# Patient Record
Sex: FEMALE | Race: WHITE
[De-identification: names, ages, dates, MRNs, and addresses within clinical notes are randomized per-mention and may not be internally consistent; named-entity substitution may affect disease eponyms.]

---

## 2017-10-24 ENCOUNTER — HOSPITAL ENCOUNTER (EMERGENCY)
Dept: HOSPITAL 25 - ED | Age: 62
Discharge: HOME | End: 2017-10-24
Payer: MEDICARE

## 2017-10-24 VITALS — SYSTOLIC BLOOD PRESSURE: 156 MMHG | DIASTOLIC BLOOD PRESSURE: 93 MMHG

## 2017-10-24 DIAGNOSIS — R10.32: ICD-10-CM

## 2017-10-24 DIAGNOSIS — K59.00: ICD-10-CM

## 2017-10-24 DIAGNOSIS — R11.2: ICD-10-CM

## 2017-10-24 DIAGNOSIS — F17.210: ICD-10-CM

## 2017-10-24 DIAGNOSIS — K21.9: Primary | ICD-10-CM

## 2017-10-24 LAB
ALBUMIN SERPL BCG-MCNC: 4.6 G/DL (ref 3.2–5.2)
ALP SERPL-CCNC: 78 U/L (ref 34–104)
ALT SERPL W P-5'-P-CCNC: 49 U/L (ref 7–52)
ANION GAP SERPL CALC-SCNC: 5 MMOL/L (ref 2–11)
AST SERPL-CCNC: 33 U/L (ref 13–39)
BUN SERPL-MCNC: 6 MG/DL (ref 6–24)
BUN/CREAT SERPL: 7.6 (ref 8–20)
CALCIUM SERPL-MCNC: 9.8 MG/DL (ref 8.6–10.3)
CHLORIDE SERPL-SCNC: 101 MMOL/L (ref 101–111)
GLOBULIN SER CALC-MCNC: 3.1 G/DL (ref 2–4)
GLUCOSE SERPL-MCNC: 100 MG/DL (ref 70–100)
HCO3 SERPL-SCNC: 30 MMOL/L (ref 22–32)
HCT VFR BLD AUTO: 47 % (ref 35–47)
HGB BLD-MCNC: 16.2 G/DL (ref 12–16)
LIPASE SERPL-CCNC: 40 U/L (ref 11–82)
MCH RBC QN AUTO: 30 PG (ref 27–31)
MCHC RBC AUTO-ENTMCNC: 35 G/DL (ref 31–36)
MCV RBC AUTO: 86 FL (ref 80–97)
POTASSIUM SERPL-SCNC: 3.8 MMOL/L (ref 3.5–5)
PROT SERPL-MCNC: 7.7 G/DL (ref 6.4–8.9)
RBC # BLD AUTO: 5.42 10^6/UL (ref 4–5.4)
SODIUM SERPL-SCNC: 136 MMOL/L (ref 133–145)
WBC # BLD AUTO: 5.2 10^3/UL (ref 3.5–10.8)

## 2017-10-24 PROCEDURE — 86140 C-REACTIVE PROTEIN: CPT

## 2017-10-24 PROCEDURE — 80053 COMPREHEN METABOLIC PANEL: CPT

## 2017-10-24 PROCEDURE — 74177 CT ABD & PELVIS W/CONTRAST: CPT

## 2017-10-24 PROCEDURE — 36415 COLL VENOUS BLD VENIPUNCTURE: CPT

## 2017-10-24 PROCEDURE — 85610 PROTHROMBIN TIME: CPT

## 2017-10-24 PROCEDURE — 85730 THROMBOPLASTIN TIME PARTIAL: CPT

## 2017-10-24 PROCEDURE — 83690 ASSAY OF LIPASE: CPT

## 2017-10-24 PROCEDURE — 85025 COMPLETE CBC W/AUTO DIFF WBC: CPT

## 2017-10-24 PROCEDURE — 99282 EMERGENCY DEPT VISIT SF MDM: CPT

## 2017-10-24 PROCEDURE — 83605 ASSAY OF LACTIC ACID: CPT

## 2017-10-24 RX ADMIN — SODIUM CHLORIDE ONE MLS/HR: 900 IRRIGANT IRRIGATION at 17:13

## 2017-10-24 NOTE — ED
Karol ARGUETA Alfonso, scribed for Diego Torrez MD on 10/24/17 at 1523 .





Abdominal Pain/Female





- HPI Summary


HPI Summary: 


 This patient is a 62 year old F presenting to [facility] with a chief complaint of 

acute on chronic lower abdominal pain and bloating, worse since a few days ago. 

The patient rates the current pain 0/10 in severity. Symptoms aggravated by 

nothing. Symptoms alleviated by nothing. Symptoms not alleviated by miralax. 

Patient reports constipation, nausea, vomiting, and gastric reflux. Patient 

denies back pain. Her last colonoscopy was approximately two years ago.





- History of Current Complaint


Chief Complaint: EDAbdPain


Stated Complaint: CONSTIPATION/UNABLE TO EAT


Time Seen by Provider: 10/24/17 15:19


Hx Obtained From: Patient


Onset/Duration: Gradual Onset, Worse Since - a few days ago, Other - acute on 

chronic


Timing: Constant


Pain Intensity: 0


Pain Scale Used: 0-10 Numeric


Location: Other - Lower


Character: Other: - bloating


Aggravating Factor(s): Nothing


Alleviating Factor(s): Nothing


Associated Signs and Symptoms: Positive: Other: - constipation, nausea, vomiting

, and gastric reflux. Patient denies back pain.


Allergies/Adverse Reactions: 


 Allergies











Allergy/AdvReac Type Severity Reaction Status Date / Time


 


Pregabalin [From Lyrica] Allergy Intermediate See Comment Verified 08/26/16 13:

42


 


Alprazolam [From Xanax] Allergy Unknown Unknown Verified 08/26/16 13:42





   Reaction  





   Details  


 


Bupropion [From Zyban] Allergy Unknown Unknown Verified 08/26/16 13:42





   Reaction  





   Details  


 


Codeine Allergy  Unknown Verified 08/26/16 13:42





   Reaction  





   Details  


 


Shellfish Allergy Allergy  Anaphylatic Verified 08/26/16 13:42





   Shock  


 


Tramadol AdvReac Intermediate Legs Jump Verified 08/26/16 13:42


 


Simvastatin AdvReac  Rash Verified 08/26/16 13:42














PMH/Surg Hx/FS Hx/Imm Hx


Cardiovascular History: Reports: Hx Hypercholesterolemia, Hx Hypertension, 

Other Cardiovascular Problems/Disorders


   Denies: Hx Pacemaker/ICD


GI History: Reports: Hx Diverticulosis - Colonoscopy 2004, Hx Irritable Bowel, 

Other GI Disorders - Elevated Liver Enzymes


Musculoskeletal History: Reports: Hx Fibromyalgia, Hx Scoliosis, Other 

Musculoskeletal History - Degenerative Disc Disease


   Comment Only: Hx Back Problems - CHRONIC BACK PAIN


Sensory History: 


   Denies: Hx Hearing Aid


Neurological History: Reports: Other Neuro Impairments/Disorders - LUMBAR 

STENOSIS


Psychiatric History: Reports: Hx Bipolar Disorder


   Denies: Hx Panic Disorder





- Cancer History


Cancer Type, Location and Year: Fibrocystic Breast (Left) Papilloma with 

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





- Surgical History


Surgery Procedure, Year, and Place: ; Intraductal Papilloma Biopsy 2003-

Hyperplasia left breast; Tonsillectomy; Hysterectomy-1993 left ovary intact; 

Tubal Ligation; Removal.  Benign Breast Lumps [id number]; Removal Benign Moles; 

Lymph Node Removal Axilla Right Arm; Cholecystectomy 6/29/09; Lump removed 

under right arm -1980; Chemical Burning of Nerves in lower back-2009


Infectious Disease History: No


Infectious Disease History: Reports: History Other Infectious Disease - 

Toxoplasmosis


   Denies: Traveled Outside the [place] in Last 30 Days





- Family History


Known Family History: Positive: Other - Cancer





- Social History


Alcohol Use: None


Substance Use Type: Reports: None


Substance Use Comment - Amount & Last Used: fentanyl patch and hydrocodone


Smoking Status (MU): Current Every Day Smoker


Type: Cigarettes


Amount Used/How Often: 1/2 PPD





Review of Systems


Negative: Fever


Positive: Abdominal Pain, Vomiting, Nausea, Other - constipation, gastric reflux


Positive: Other - Negative back pain


All Other Systems Reviewed And Are Negative: Yes





Physical Exam





- Summary


Physical Exam Summary: 





General: well-appearing, no pain distress


Skin: warm, color reflects adequate perfusion, dry


Head: normal


Eyes: EOMI, FRANCO


ENT: normal


Neck: supple, nontender


Respiratory: CTA, breath sounds present


Cardiovascular: RRR


Abdomen: soft, Mild lower abdominal tenderness and LUQ.


Bowel: Hypoactive bowel sounds


Musculoskeletal: normal, strength/ROM intact


Neurological: normal, sensory/motor intact, A&O x3


Psychological: affect/mood appropriate


Triage Information Reviewed: Yes


Vital Signs On Initial Exam: 


 Initial Vitals











Temp Pulse Resp BP Pulse Ox


 


 97.3 F   103   20   157/109   98 


 


 10/24/17 13:29  10/24/17 13:29  10/24/17 13:29  10/24/17 13:29  10/24/17 13:29











Vital Signs Reviewed: Yes





Diagnostics





- Vital Signs


 Vital Signs











  Temp Pulse Resp BP Pulse Ox


 


 10/24/17 13:29  97.3 F  103  20  157/109  98














- Laboratory


Lab Results: 


 Lab Results











  10/24/17 10/24/17 10/24/17 Range/Units





  16:30 16:30 16:30 


 


WBC   5.2   (3.5-10.8)  10^3/ul


 


RBC   5.42 H   (4.0-5.4)  10^6/ul


 


Hgb   16.2 H   (12.0-16.0)  g/dl


 


Hct   47   (35-47)  %


 


MCV   86   (80-97)  fL


 


MCH   30   (27-31)  pg


 


MCHC   35   (31-36)  g/dl


 


RDW   13   (10.5-15)  %


 


Plt Count   316   (150-450)  10^3/ul


 


MPV   7 L   (7.4-10.4)  um3


 


Neut % (Auto)   54.3   (38-83)  %


 


Lymph % (Auto)   32.8   (25-47)  %


 


Mono % (Auto)   8.8   (1-9)  %


 


Eos % (Auto)   3.2   (0-6)  %


 


Baso % (Auto)   0.9   (0-2)  %


 


Absolute Neuts (auto)   2.8   (1.5-7.7)  10^3/ul


 


Absolute Lymphs (auto)   1.7   (1.0-4.8)  10^3/ul


 


Absolute Monos (auto)   0.5   (0-0.8)  10^3/ul


 


Absolute Eos (auto)   0.2   (0-0.6)  10^3/ul


 


Absolute Basos (auto)   0   (0-0.2)  10^3/ul


 


Absolute Nucleated RBC   0.03   10^3/ul


 


Nucleated RBC %   0.5   


 


INR (Anticoag Therapy)    0.83 L  (0.89-1.11)  


 


APTT    35.4  (26.0-36.3)  seconds


 


Sodium  136    (133-145)  mmol/L


 


Potassium  3.8    (3.5-5.0)  mmol/L


 


Chloride  101    (101-111)  mmol/L


 


Carbon Dioxide  30    (22-32)  mmol/L


 


Anion Gap  5    (2-11)  mmol/L


 


BUN  6    (6-24)  mg/dL


 


Creatinine  0.79    (0.51-0.95)  mg/dL


 


Est GFR ( Amer)  94.8    (>60)  


 


Est GFR (Non-Af Amer)  73.7    (>60)  


 


BUN/Creatinine Ratio  7.6 L    (8-20)  


 


Glucose  100    ()  mg/dL


 


Lactic Acid     (0.5-2.0)  mmol/L


 


Calcium  9.8    (8.6-10.3)  mg/dL


 


Total Bilirubin  0.60    (0.2-1.0)  mg/dL


 


AST  33    (13-39)  U/L


 


ALT  49    (7-52)  U/L


 


Alkaline Phosphatase  78    ()  U/L


 


C-Reactive Protein  6.61 H    (< 5.00)  mg/L


 


Total Protein  7.7    (6.4-8.9)  g/dL


 


Albumin  4.6    (3.2-5.2)  g/dL


 


Globulin  3.1    (2-4)  g/dL


 


Albumin/Globulin Ratio  1.5    (1-3)  


 


Lipase  40    (11.0-82.0)  U/L














  10/24/17 Range/Units





  16:30 


 


WBC   (3.5-10.8)  10^3/ul


 


RBC   (4.0-5.4)  10^6/ul


 


Hgb   (12.0-16.0)  g/dl


 


Hct   (35-47)  %


 


MCV   (80-97)  fL


 


MCH   (27-31)  pg


 


MCHC   (31-36)  g/dl


 


RDW   (10.5-15)  %


 


Plt Count   (150-450)  10^3/ul


 


MPV   (7.4-10.4)  um3


 


Neut % (Auto)   (38-83)  %


 


Lymph % (Auto)   (25-47)  %


 


Mono % (Auto)   (1-9)  %


 


Eos % (Auto)   (0-6)  %


 


Baso % (Auto)   (0-2)  %


 


Absolute Neuts (auto)   (1.5-7.7)  10^3/ul


 


Absolute Lymphs (auto)   (1.0-4.8)  10^3/ul


 


Absolute Monos (auto)   (0-0.8)  10^3/ul


 


Absolute Eos (auto)   (0-0.6)  10^3/ul


 


Absolute Basos (auto)   (0-0.2)  10^3/ul


 


Absolute Nucleated RBC   10^3/ul


 


Nucleated RBC %   


 


INR (Anticoag Therapy)   (0.89-1.11)  


 


APTT   (26.0-36.3)  seconds


 


Sodium   (133-145)  mmol/L


 


Potassium   (3.5-5.0)  mmol/L


 


Chloride   (101-111)  mmol/L


 


Carbon Dioxide   (22-32)  mmol/L


 


Anion Gap   (2-11)  mmol/L


 


BUN   (6-24)  mg/dL


 


Creatinine   (0.51-0.95)  mg/dL


 


Est GFR ( Amer)   (>60)  


 


Est GFR (Non-Af Amer)   (>60)  


 


BUN/Creatinine Ratio   (8-20)  


 


Glucose   ()  mg/dL


 


Lactic Acid  0.8  (0.5-2.0)  mmol/L


 


Calcium   (8.6-10.3)  mg/dL


 


Total Bilirubin   (0.2-1.0)  mg/dL


 


AST   (13-39)  U/L


 


ALT   (7-52)  U/L


 


Alkaline Phosphatase   ()  U/L


 


C-Reactive Protein   (< 5.00)  mg/L


 


Total Protein   (6.4-8.9)  g/dL


 


Albumin   (3.2-5.2)  g/dL


 


Globulin   (2-4)  g/dL


 


Albumin/Globulin Ratio   (1-3)  


 


Lipase   (11.0-82.0)  U/L











Result Diagrams: 


 10/24/17 16:30





 10/24/17 16:30


Lab Statement: Any lab studies that have been ordered have been reviewed, and 

results considered in the medical decision making process.





- CT


  ** A/P


CT Interpretation Completed By: Radiologist - 1.  Mild hepatic steatosis. 2.  

Cholecystectomy. Hysterectomy. 3.  Large amount of stool throughout the colon 

with mild distention. Scattered diverticula. Mild chronic bowel wall 

thickening. Consider follow-up colonoscopy. ED physician has reviewed this 

radiology report and agrees.





Abdominal Pain Fem Course/Dx





- Course


Course Of Treatment: HAD A SMALL BM IN ED.  DISCUSSED RESULTS WITH PATIENT.  

WILL RX GOLYTELY.  F/U PMD/GASTROENTEROLOGY; RETURN IF WORSE.





- Diagnoses


Provider Diagnoses: 


 Abdominal pain, Constipation, Left sided abdominal pain, Lower abdominal pain








Discharge





- Discharge Plan


Condition: Stable


Disposition: HOME


Prescriptions: 


Peg 3000 Gi Lavage* [Golytely*] 4,000 ml PO ONCE #1 btl


Patient Education Materials:  Abdominal Pain (ED), Constipation (ED)


Referrals: 


[facility] [Provider Group]


[facility] [Outside]


Non Staff,Doctor [Primary Care Provider] - 


Additional Instructions: 


FOLLOW UP WITH YOUR DOCTOR.


RETURN TO THE EMERGENCY DEPARTMENT FOR ANY WORSENING OF YOUR CONDITION; PAIN, 

FEVER, YOU FEEL ILL, VOMITING OR QUESTIONS OR CONCERNS.





The documentation as recorded by the Karol medina Alfonso accurately reflects 

the service I personally performed and the decisions made by me, Diego Torrez MD.

## 2017-10-24 NOTE — RAD
INDICATION: Left lower abdominal pain     



COMPARISON: August 30, 2011, November 02, 2010

 

TECHNIQUE: Axial source images were obtained from the hemidiaphragms to the symphysis

pubis following administration of oral and intravenous contrast.  And 9 mL Omnipaque 300

was utilized. Coronal and sagittal reconstructed images were acquired.



Lung bases: There is a rounded 3 mm nodule in left lung base. This is likely an incidental

chronic inflammatory focus. This was not clearly present previously but this could be

related to technical factors. Suggest nonemergent 6 month follow-up noncontrast imaging

the chest.. 



Liver: There is mild hepatic steatosis. The liver is normal in size. There are no masses.

There is no ductal dilatation.



Gallbladder: Cholecystectomy.



Spleen: The spleen is normal in size. There are no masses.



Pancreas: There is no focal pancreatic mass or ductal dilatation.



Adrenal glands: There is no evidence of adrenal mass.



Kidneys: The kidneys are normal in size and position. There are prompt nephrograms and

there is prompt excretion bilaterally. There are no renal parenchymal masses. There is no

evidence of nephrolithiasis.



Adenopathy: There is no evidence of adenopathy by size criteria.



Fluid collections: There are no free or localized fluid collections.



Vessels:There are no significant atherosclerotic changes involving the aorta. There is no

focal aneurysm. The iliac vessels are normal in caliber. The IVC appears normal.



GI tract: The upper GI tract is unremarkable. There is diffuse stool throughout the colon

which is mildly distended. There are moderate diverticula of the sigmoid colon. There is

minor bowel wall thickening which appears chronic. Consider follow-up endoscopy.



Pelvic organs: There is hysterectomy. There is no adnexal mass



Bladder: There are no bladder masses.



Abdominal and pelvic soft tissues: The extraperitoneal abdominal and pelvic soft tissues

appear normal..



Osseous structures: There are no acute osseous findings. There is moderate levoscoliosis

with multilevel degenerative disc disease of the lumbar spine.



Other: None



IMPRESSION:

1.  Mild hepatic steatosis.

2.  Cholecystectomy. Hysterectomy.

3.  Large amount of stool throughout the colon with mild distention. Scattered

diverticula. Mild chronic bowel wall thickening. Consider follow-up colonoscopy

## 2018-10-07 ENCOUNTER — HOSPITAL ENCOUNTER (INPATIENT)
Dept: HOSPITAL 25 - ED | Age: 63
LOS: 3 days | Discharge: HOME | DRG: 378 | End: 2018-10-10
Attending: HOSPITALIST | Admitting: INTERNAL MEDICINE
Payer: MEDICARE

## 2018-10-07 DIAGNOSIS — K44.9: ICD-10-CM

## 2018-10-07 DIAGNOSIS — F32.9: ICD-10-CM

## 2018-10-07 DIAGNOSIS — G47.33: ICD-10-CM

## 2018-10-07 DIAGNOSIS — F39: ICD-10-CM

## 2018-10-07 DIAGNOSIS — K25.4: Primary | ICD-10-CM

## 2018-10-07 DIAGNOSIS — Z80.7: ICD-10-CM

## 2018-10-07 DIAGNOSIS — E78.5: ICD-10-CM

## 2018-10-07 DIAGNOSIS — Z80.1: ICD-10-CM

## 2018-10-07 DIAGNOSIS — F41.9: ICD-10-CM

## 2018-10-07 DIAGNOSIS — M54.5: ICD-10-CM

## 2018-10-07 DIAGNOSIS — Z87.891: ICD-10-CM

## 2018-10-07 DIAGNOSIS — Z80.8: ICD-10-CM

## 2018-10-07 DIAGNOSIS — Z90.710: ICD-10-CM

## 2018-10-07 DIAGNOSIS — Z80.0: ICD-10-CM

## 2018-10-07 DIAGNOSIS — Z80.3: ICD-10-CM

## 2018-10-07 DIAGNOSIS — Z90.49: ICD-10-CM

## 2018-10-07 DIAGNOSIS — Z87.440: ICD-10-CM

## 2018-10-07 DIAGNOSIS — K58.1: ICD-10-CM

## 2018-10-07 DIAGNOSIS — K22.2: ICD-10-CM

## 2018-10-07 DIAGNOSIS — E86.0: ICD-10-CM

## 2018-10-07 DIAGNOSIS — M79.7: ICD-10-CM

## 2018-10-07 DIAGNOSIS — D62: ICD-10-CM

## 2018-10-07 DIAGNOSIS — I95.9: ICD-10-CM

## 2018-10-07 DIAGNOSIS — G89.4: ICD-10-CM

## 2018-10-07 DIAGNOSIS — K31.1: ICD-10-CM

## 2018-10-07 DIAGNOSIS — I10: ICD-10-CM

## 2018-10-07 DIAGNOSIS — F43.10: ICD-10-CM

## 2018-10-07 DIAGNOSIS — G43.909: ICD-10-CM

## 2018-10-07 LAB
BASOPHILS # BLD AUTO: 0 10^3/UL (ref 0–0.2)
BASOPHILS # BLD AUTO: 0.1 10^3/UL (ref 0–0.2)
EOSINOPHIL # BLD AUTO: 0 10^3/UL (ref 0–0.6)
EOSINOPHIL # BLD AUTO: 0.1 10^3/UL (ref 0–0.6)
HCT VFR BLD AUTO: 32 % (ref 35–47)
HCT VFR BLD AUTO: 37 % (ref 35–47)
HGB BLD-MCNC: 11.2 G/DL (ref 12–16)
HGB BLD-MCNC: 12.8 G/DL (ref 12–16)
LYMPHOCYTES # BLD AUTO: 1.2 10^3/UL (ref 1–4.8)
LYMPHOCYTES # BLD AUTO: 2 10^3/UL (ref 1–4.8)
MCH RBC QN AUTO: 30 PG (ref 27–31)
MCH RBC QN AUTO: 30 PG (ref 27–31)
MCHC RBC AUTO-ENTMCNC: 35 G/DL (ref 31–36)
MCHC RBC AUTO-ENTMCNC: 35 G/DL (ref 31–36)
MCV RBC AUTO: 86 FL (ref 80–97)
MCV RBC AUTO: 87 FL (ref 80–97)
MONOCYTES # BLD AUTO: 0.3 10^3/UL (ref 0–0.8)
MONOCYTES # BLD AUTO: 0.4 10^3/UL (ref 0–0.8)
NEUTROPHILS # BLD AUTO: 2.5 10^3/UL (ref 1.5–7.7)
NEUTROPHILS # BLD AUTO: 7.4 10^3/UL (ref 1.5–7.7)
NRBC # BLD AUTO: 0 10^3/UL
NRBC # BLD AUTO: 0 10^3/UL
NRBC BLD QL AUTO: 0
NRBC BLD QL AUTO: 0.2
PLATELET # BLD AUTO: 273 10^3/UL (ref 150–450)
PLATELET # BLD AUTO: 386 10^3/UL (ref 150–450)
RBC # BLD AUTO: 3.68 10^6/UL (ref 4–5.4)
RBC # BLD AUTO: 4.27 10^6/UL (ref 4–5.4)
WBC # BLD AUTO: 5 10^3/UL (ref 3.5–10.8)
WBC # BLD AUTO: 9 10^3/UL (ref 3.5–10.8)

## 2018-10-07 PROCEDURE — 81015 MICROSCOPIC EXAM OF URINE: CPT

## 2018-10-07 PROCEDURE — 88305 TISSUE EXAM BY PATHOLOGIST: CPT

## 2018-10-07 PROCEDURE — 86850 RBC ANTIBODY SCREEN: CPT

## 2018-10-07 PROCEDURE — 99285 EMERGENCY DEPT VISIT HI MDM: CPT

## 2018-10-07 PROCEDURE — 36415 COLL VENOUS BLD VENIPUNCTURE: CPT

## 2018-10-07 PROCEDURE — 86140 C-REACTIVE PROTEIN: CPT

## 2018-10-07 PROCEDURE — 87338 HPYLORI STOOL AG IA: CPT

## 2018-10-07 PROCEDURE — 85018 HEMOGLOBIN: CPT

## 2018-10-07 PROCEDURE — 99156 MOD SED OTH PHYS/QHP 5/>YRS: CPT

## 2018-10-07 PROCEDURE — 86900 BLOOD TYPING SEROLOGIC ABO: CPT

## 2018-10-07 PROCEDURE — 82270 OCCULT BLOOD FECES: CPT

## 2018-10-07 PROCEDURE — 88342 IMHCHEM/IMCYTCHM 1ST ANTB: CPT

## 2018-10-07 PROCEDURE — 86901 BLOOD TYPING SEROLOGIC RH(D): CPT

## 2018-10-07 PROCEDURE — 85027 COMPLETE CBC AUTOMATED: CPT

## 2018-10-07 PROCEDURE — 85025 COMPLETE CBC W/AUTO DIFF WBC: CPT

## 2018-10-07 PROCEDURE — 80053 COMPREHEN METABOLIC PANEL: CPT

## 2018-10-07 PROCEDURE — 99157 MOD SED OTHER PHYS/QHP EA: CPT

## 2018-10-07 PROCEDURE — 74177 CT ABD & PELVIS W/CONTRAST: CPT

## 2018-10-07 PROCEDURE — 81003 URINALYSIS AUTO W/O SCOPE: CPT

## 2018-10-07 PROCEDURE — 83690 ASSAY OF LIPASE: CPT

## 2018-10-07 PROCEDURE — 82271 OCCULT BLOOD OTHER SOURCES: CPT

## 2018-10-07 PROCEDURE — 93005 ELECTROCARDIOGRAM TRACING: CPT

## 2018-10-07 PROCEDURE — 85014 HEMATOCRIT: CPT

## 2018-10-07 PROCEDURE — 80048 BASIC METABOLIC PNL TOTAL CA: CPT

## 2018-10-07 PROCEDURE — 87086 URINE CULTURE/COLONY COUNT: CPT

## 2018-10-07 NOTE — ADMNOTE
Subjective


Date of Service: 10/07/18


Interval History: 





hpi 


63 yr old wf with sig psy hx presented to er with sudden onset of left lower 

abd pain spreading to her entire abd ---> came here to be checked---> pt was 

found to have sbp 80s got 2.5 liter ns with repeat sbp went up to 130s ---> got 

one dose of morphine 4 mg times one from er despite of low sbp. pt vomited 

three times with coffee ground emesis from er test heme + ---> got one dose of 

protonix iv. her intial bun was 39 ( baseline was <20 ) but hg ( 1st set ) >11


ct of abd neg ---> spoke with gi in am for egd besides h/h q6 and protonix bid 

iv. 


abd pain is totally gone when seen. 


pain described as intermittant dull all over her abd 


+ nausea unable to tolerate any po ---> last meals was yesterdy with two pieces 

of toast and one small glass of milk 


pt was recently treated with cipro for 5 days for uti and wanted repeat ua 

since she still has the burning 


Family History: Unchanged from Admission - liver/lung/lymphoma in her mom + 

thyoid cancer in her mom and sister aunt + breast ca


Social History: Unchanged from Admission - 2-3 cig daily but quit 9/18 no etoh 

walks indep comes from lives with 


Past Medical History: Unchanged from Admission - chornic lbp with 

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 

fibromyelgia hyperlipidemia htn ibs htn nancy  pshx r anxillary lump removed 1980 

benign s/p hysterectomy 1993, lbreast nodules resection with all benign path s/

p cholecystectomy 2009 s/p ablation of nerves in the lower back trauma to her 

left side of head ---> s/p staples





Review of Systems





- Measurements


Intake and Output: 


Intake and Output Last 24 Hours











 10/05/18 10/06/18 10/07/18 10/08/18





 06:59 06:59 06:59 06:59


 


Intake Total    1000


 


Balance    1000


 


Weight    185 lb


 


Intake:    


 


  IV Fluids    1000














- Review of Systems


General Comments: 





12/12 ros reviewed with her please refer to hpi for details 





Objective


Active Medications: 








Sodium Chloride (Ns 0.9% 1000 Ml*)  1,000 mls @ 1,000 mls/hr IV ED ONCE ONE


   Stop: 10/07/18 23:19


   Last Admin: 10/07/18 22:29 Dose:  1,000 mls/hr








 Vital Signs - 8 hr











  10/07/18 10/07/18 10/07/18





  17:58 17:59 18:00


 


Temperature  96.9 F 


 


Pulse Rate  99 102


 


Respiratory 13 16 14





Rate   


 


Blood Pressure  83/60 





(mmHg)   


 


O2 Sat by Pulse  99 100





Oximetry   














  10/07/18 10/07/18 10/07/18





  18:01 18:03 18:20


 


Temperature   


 


Pulse Rate 99 100 106


 


Respiratory 17 17 20





Rate   


 


Blood Pressure  83/60 80/62





(mmHg)   


 


O2 Sat by Pulse 99 100 98





Oximetry   














  10/07/18 10/07/18 10/07/18





  18:22 18:30 19:00


 


Temperature   


 


Pulse Rate 106 92 87


 


Respiratory 13 15 18





Rate   


 


Blood Pressure 86/71 78/57 89/64





(mmHg)   


 


O2 Sat by Pulse 99 100 100





Oximetry   














  10/07/18 10/07/18 10/07/18





  19:01 19:02 19:20


 


Temperature   


 


Pulse Rate 86 79 


 


Respiratory 25 10 15





Rate   


 


Blood Pressure   113/68





(mmHg)   


 


O2 Sat by Pulse 98 99 





Oximetry   














  10/07/18 10/07/18 10/07/18





  19:47 20:00 20:02


 


Temperature   


 


Pulse Rate 78 78 78


 


Respiratory 11 11 16





Rate   


 


Blood Pressure 103/50  100/59





(mmHg)   


 


O2 Sat by Pulse 95 96 96





Oximetry   














  10/07/18 10/07/18 10/07/18





  20:17 20:33 21:00


 


Temperature   


 


Pulse Rate 81  


 


Respiratory 11 16 18





Rate   


 


Blood Pressure 100/66 101/66 





(mmHg)   


 


O2 Sat by Pulse 97  





Oximetry   














  10/07/18 10/07/18





  21:11 21:17


 


Temperature  


 


Pulse Rate 101 92


 


Respiratory 15 13





Rate  


 


Blood Pressure 95/71 106/72





(mmHg)  


 


O2 Sat by Pulse 99 97





Oximetry  











Eyes: No Scleral Icterus, PERRLA


Ears/Nose/Mouth/Throat: NL Teeth, Lips, Gums, Clear Oropharnyx, Mucous 

Membranes Moist


Neck: NL Appearance and Movements; NL JVP, Trachea Midline, No Thyroid 

Enlargement, Masses


Respiratory: Symmetrical Chest Expansion and Respiratory Effort, Clear to 

Auscultation


Cardiovascular: NL Sounds; No Murmurs; No JVD, RRR, No Edema


Abdominal: NL Sounds; No Tenderness; No Distention


Extremities: No Edema, No Clubbing, Cyanosis


Skin: No Rash or Ulcers


Neurological: Alert and Oriented x 3, NL Gait, NL Muscle Strength and Tone, - - 

sensory b/l ue and le equal 2/2 plantar reflex downwards 


Result Diagrams: 


 10/08/18 00:15





 10/07/18 18:27


Microbiology and Other Data: 


 Microbiology











 10/07/18 21:08 Stool Occult Blood (MIGUEL A) - Final





 Stool 


 


 10/07/18 20:48 Gastric Occult Blood - Final





 Gastric Fluid 














Assess/Plan/Problems-Billing


Assessment: 











- Patient Problems


(1) Mood disorder


Current Visit: Yes   Status: Acute   Code(s): F39 - UNSPECIFIED MOOD [AFFECTIVE

] DISORDER   SNOMED Code(s): 43477762


   Comment: stable pt currently wlll be npo but can resume once she is able to 

tolerate po 


   





(2) Hypotension


Current Visit: Yes   Status: Acute   Comment: responded to ivf ---> not sure 

the intial blood pressure was correct since pt is not tachycardia as she is 

supposed to 


- ivf and tele and moniter at this point 


- ck ua to r/o uti---> just finished her abx 5 days with cipro    





(3) Intractable nausea and vomiting


Current Visit: Yes   Status: Acute   Code(s): R11.2 - NAUSEA WITH VOMITING, 

UNSPECIFIED   SNOMED Code(s): 138488145


   Comment: symptoms resovled when seen 


gi will see pt in am due to + heme vomitus 


- protonix 


- reglan + zofran prn 


   





(4) Domi-Arredondo tear


Current Visit: Yes   Status: Acute   Code(s): K22.6 - GASTRO-ESOPHAGEAL 

LACERATION-HEMORRHAGE SYNDROME   SNOMED Code(s): 231683340


   Comment: no massive bleed with stable h/h will need protnix bid iv npo 

strict gi eval for egd serial h/h q 6 as per gi    





(5) Elevated BUN


Current Visit: Yes   Status: Acute   Code(s): R79.9 - ABNORMAL FINDING OF BLOOD 

CHEMISTRY, UNSPECIFIED   SNOMED Code(s): 235291843


   Comment: etiology unclear possible related to her gi issue 


protonix bid 


gi eval    





(6) Dehydration


Current Visit: Yes   Status: Acute   Code(s): E86.0 - DEHYDRATION   SNOMED Code(

s): 96893390


   Comment: ivf as toleraated    





(7) Hx of recurrent urinary tract infection


Current Visit: Yes   Status: Acute   Code(s): Z87.440 - PERSONAL HISTORY OF 

URINARY (TRACT) INFECTIONS   SNOMED Code(s): 958556734


   Comment: repeat ua no abx for now unless ua is +

## 2018-10-07 NOTE — RAD
EXAM:

  CT Abdomen and Pelvis With Intravenous Contrast



CLINICAL HISTORY:

  63 years old, female; Pain; Abdominal pain; Localized; Left lower quadrant 

(llq); Additional info: Llq pain, HX of sbo



TECHNIQUE:

  Axial computed tomography images of the abdomen and pelvis with intravenous 

contrast.  All CT scans at this facility use at least one of these dose 

optimization techniques: automated exposure control; mA and/or kV adjustment 

per patient size (includes targeted exams where dose is matched to clinical 

indication); or iterative reconstruction.

  Coronal and sagittal reformatted images were created and reviewed.



CONTRAST:

  100 mL of [id number] administered intravenously.  



COMPARISON:

  A/P W CT ABD/PEL W 10/24/2017 5:47 PM



FINDINGS:

  Lung bases:  Normal.  No mass.  No consolidation.



 ABDOMEN:

  Liver:  Normal.  No masses. Portal and hepatic veins are patent.

  Gallbladder and bile ducts:  Surgically absent gallbladder.

  Pancreas:  Normal.  No mass.  No ductal dilation.

  Spleen:  Normal.  No splenomegaly.

  Adrenals:  Normal.  No mass.

  Kidneys and ureters:  Indeterminate right renal lesion in the midpole 

measures 1.1 cm unchanged from prior study (series 4, image 18). The calculi 

pelvocaliectasis.

  Stomach and bowel:  Incompletely distended grossly normal stomach. Normal 

caliber small bowel. No colonic masses or segmental wall thickening.



 PELVIS:

  Appendix:  No dilation or periappendiceal inflammation.

  Bladder:  Thin-walled bladder with no focal nodularity, perivesicular 

stranding, or calcifications.

  Reproductive:  Uterus and ovaries are surgically absent.



 ABDOMEN and PELVIS:

  Intraperitoneal space:  Normal.  No pneumoperitoneum.  No ascities.

  Bones/joints:  The spine demonstrates mild degenerative changes at multiple 

levels.  Levoscoliosis lower lumbar spine.  Mild bilateral hip primary 

osteoarthritis.  No fractures. No suspicious bone lesions.

  Soft tissues:  Normal. No hernias.

  Vasculature:  Normal caliber aorta with no evidence of dissection or rupture. 

Patent IVC.

  Lymph nodes:  Normal.  No enlarged lymph nodes.



IMPRESSION:     

  1. No CT findings to correlate with patient's symptomatology.



  2. Indeterminate right renal lesion likely a Bosniak type II cyst for which 

no followup is indicated.



To contact [facility] with a general question: [facility] - [phone]

For direct physician to physician contact: Physician Hotline - [phone]

[facility] ([facility] Facility ID #[id number])

## 2018-10-08 LAB
HCT VFR BLD AUTO: 23 % (ref 35–47)
HCT VFR BLD AUTO: 25 % (ref 35–47)
HCT VFR BLD AUTO: 31 % (ref 35–47)
HGB BLD-MCNC: 10.8 G/DL (ref 12–16)
HGB BLD-MCNC: 7.8 G/DL (ref 12–16)
HGB BLD-MCNC: 8.8 G/DL (ref 12–16)
MCH RBC QN AUTO: 30 PG (ref 27–31)
MCH RBC QN AUTO: 31 PG (ref 27–31)
MCHC RBC AUTO-ENTMCNC: 34 G/DL (ref 31–36)
MCHC RBC AUTO-ENTMCNC: 36 G/DL (ref 31–36)
MCV RBC AUTO: 86 FL (ref 80–97)
MCV RBC AUTO: 87 FL (ref 80–97)
PLATELET # BLD AUTO: 233 10^3/UL (ref 150–450)
PLATELET # BLD AUTO: 257 10^3/UL (ref 150–450)
RBC # BLD AUTO: 2.65 10^6/UL (ref 4–5.4)
RBC # BLD AUTO: 2.87 10^6/UL (ref 4–5.4)
RBC UR QL AUTO: (no result)
WBC # BLD AUTO: 5.3 10^3/UL (ref 3.5–10.8)
WBC # BLD AUTO: 5.4 10^3/UL (ref 3.5–10.8)
WBC UR QL AUTO: (no result)

## 2018-10-08 PROCEDURE — 0DB68ZX EXCISION OF STOMACH, VIA NATURAL OR ARTIFICIAL OPENING ENDOSCOPIC, DIAGNOSTIC: ICD-10-PCS | Performed by: INTERNAL MEDICINE

## 2018-10-08 RX ADMIN — SODIUM CHLORIDE SCH MLS/HR: 900 IRRIGANT IRRIGATION at 00:44

## 2018-10-08 RX ADMIN — LORAZEPAM PRN MG: 2 INJECTION INTRAMUSCULAR; INTRAVENOUS at 20:45

## 2018-10-08 RX ADMIN — PANTOPRAZOLE SODIUM SCH MG: 40 INJECTION, POWDER, FOR SOLUTION INTRAVENOUS at 20:45

## 2018-10-08 RX ADMIN — ACETAMINOPHEN PRN MG: 325 TABLET ORAL at 22:59

## 2018-10-08 RX ADMIN — SODIUM CHLORIDE SCH MLS/HR: 900 IRRIGANT IRRIGATION at 08:27

## 2018-10-08 RX ADMIN — METOPROLOL TARTRATE SCH: 5 INJECTION, SOLUTION INTRAVENOUS at 08:35

## 2018-10-08 RX ADMIN — METOPROLOL TARTRATE SCH: 5 INJECTION, SOLUTION INTRAVENOUS at 20:46

## 2018-10-08 RX ADMIN — PANTOPRAZOLE SODIUM SCH MG: 40 INJECTION, POWDER, FOR SOLUTION INTRAVENOUS at 10:17

## 2018-10-08 NOTE — CONS
GASTROENTEROLOGY CONSULT:

 

DATE OF CONSULT:  10/08/18

 

REASON FOR CONSULT:  Concern for upper GI bleed.

 

HISTORY OF PRESENT ILLNESS:  Ms. Vasquez is a 63-year-old woman with a history 
of mood disorder, fibromyalgia, IBS with constipation, chronic lower back pain, 
who presented to the ER with abdominal pain and constipation x2 to 3 days. Ms. Vasquez thought that this the pain and constipation was an exacerbation of her 
underlying IBS with constipation. In the ER, she was noted to have systolic 
blood pressure in the 80s. She was given 2 to 3 liters of fluid with 
improvement in her blood pressure. She reportedly then had several episodes of 
coffee-ground emesis.  The patient does not recall seeing the emesis. No note 
made of bright red blood in the emesis on the admission H and P. Gastroccult 
was positive. The patient's labs were notable for hematocrit of 37 on arrival 
with an elevated BUN to 39.  She was given IV PPI in addition to the IV fluids.
  Her labs have demonstrated a down trend in hematocrit over serial checks to 
25. Her hemodynamics have remained stable. A CT abdomen and pelvis was negative 
for any acute GI pathology.  GI consulted to consider endoscopy.

 

On interview, Ms. Vasquez says that her abdominal pain has much improved.  She 
reports having 3 black stools today.  She denies seeing any red blood in the 
stool, though she comments that she has not really been closely evaluating it.  
She has not had any further coffee-ground emesis or hematemesis since admission 
in the ER overnight. She thinks she might have been told that she had an ulcer 
in the past, although she is not clear any of the details.  She denies any 
NSAIDs use.

 

PAST MEDICAL HISTORY:  Medical history includes mood disorder, chronic low back 
pain, fibromyalgia, hypertension, hyperlipidemia, IBS with constipation.

 

FAMILY HISTORY:  No GI or liver disease in family history.

 

SOCIAL HISTORY:  Smokes cigarettes.  No alcohol use.  No drug use.

 

REVIEW OF SYSTEMS:  Negative except as above.

 

PHYSICAL EXAM:  

Vital Signs:  Normal temperature, heart rate in the 80s, blood pressure 115/59, 
pulse ox sat of 99% on room air.  General:  Well-appearing, comfortable woman, 
in no acute distress. Making jokes and in a good spirit. 

HEENT:  Mucous membranes are moist.  No dried blood in mouth or lips.

Cardiovascular:  Regular rate and rhythm. No murmurs, rubs, or gallops. 

Pulmonary:  Lungs are clear to auscultation.  

Abdomen:  Soft, nontender, nondistended.  

Extremities:  Warm, well perfused, no edema.  

Psych:  Appears to be normal behavior and normal affect during our interview.

 

DIAGNOSTIC STUDIES/LAB DATA:  Labs reviewed and summarized in the HPI. Notable 
for an initial hematocrit of 37 and BUN elevated to 39. Her hematocrit has 
drifted down over the course of last 24 hours or so to hematocrit of 25.  Stool 
occult was negative. Gastroccult was apparently positive.



CT abdomen and pelvis was performed on 10/07/18.  Report reviewed.  She was 
noted to have an indeterminant right renal lesion, but no other acute findings 
were noted.

 

IMPRESSION AND RECOMMENDATIONS:  Ms. Vasquez is a 63-year-old woman with a 
psychiatric history and irritable bowel syndrome with constipation, who 
presented with abdominal pain and constipation.  In the ER, she was noted to be 
hypotensive, but fluid responsive. She then developed several episodes of coffee
-ground emesis and has had several episodes of melena today. Her lab work is 
notable for an elevated BUN on admission and the hematocrit dropped from 37 to 
25 over serial labs.

 

Presentation is concerning for an upper GI bleed.  The patient denies any 
NSAIDs use, although she mentions that she might have been told she had an 
ulcer in the past. 



- NPO

- Continue IV PPI BID or gtt

- Continue to monitor CBC

- Plan for EGD this afternoon to evaluate further

 

Procedure note to follow with final recommendations.

 

Thank you very much for this consult.

 

 [id number]/[facility] #: [id number]

DYANA

## 2018-10-08 NOTE — PRO
PROCEDURE NOTE:

 

DATE OF PROCEDURE:  10/08/18

 

PROCEDURE:  EGD with biopsy.

 

INDICATION:  Concern for upper GI bleed with coffee-ground emesis, melena, and 
hematocrit drop.

 

MEDICATIONS GIVEN:

1.  Fentanyl 100 mcg IV.

2.  Midazolam 10 mg IV.

 

DESCRIPTION OF PROCEDURE:  Full disclosure of risks was reviewed with the 
patient as detailed on the consent form.  The patient was placed in the left 
lateral decubitus position and monitored with continuous pulse oximetry, 
capnography, interval blood pressure monitoring, and direct observation. A bite-
block was placed between her teeth.  The adult gastroscope was slowly and 
carefully advanced into the esophagus, into the stomach, and into the distal 
duodenum. Findings are as below.

 

FINDINGS: 

E:

- Esophagus was a normal tubular structure without any esophagitis, erosions, 
or ulcers.  

- There was an incomplete mild patent Schatzki's ring in the distal esophagus. 

- There was no evidence of Domi-Arredondo tear.



G: 

- The scope was advanced into the stomach. The stomach was examined in the 
forward and retroflexed views.

- A hiatal hernia was appreciated. There were no Darrius's erosions or ulcers 
in the hiatal hernia. 

- There was yellow liquid fluid in the stomach. No fresh or old blood.  

- There were areas of linear erythema (antrum>body). Biopsies obtained. 

- A 1-cm clean-based prepyloric ulcer was noted in the 12 to 2 o'clock 
position. There was associated edema and narrowing at the pylorus. The adult 
gastroscope was unable to pass through the pylorus into the duodenum. The adult 
gastroscope was therefore withdrawn. The XP scope (much thinner diameter) was 
advanced down into the esophagus and into the stomach and easily passed through 
the pylorus into the duodenum. 



D:

- The second portion of the duodenum appeared erythematous and edematous 
without any overt ulceration or erosion.  

- There was bilious fluid seen on the third portion of the duodenum.  No fresh 
or old blood.  



The scope was then slowly withdrawn. Patient tolerated the procedure well and 
was returned to the floor.



IMPRESSION:

1.  Patent Schatzki's ring.

2.  Hiatal hernia.

3.  Linear erythema in the gastric antrum and body.

4.  A 1-cm clean-based ulcer in the prepyloric antrum/pyloric channel resulting 
in some edema and narrowing at the pylorus. This is felt to be the source of 
bleeding. No active bleeding (or evidence of recent bleeding) at time of exam.

4.  Adult gastroscope was unable to traverse the pylorus due to edema and 
narrowing of pylorus. XP gastroscope (much thinner diameter) was able to pass 
into duodenum and did not note any alternate or additional bleeding source in 
duodenum.

 

RECOMMENDATIONS:

- Await pathology from gastric biopsy

- Continue to monitor CBC. 

- Recommend IV PPI b.i.d. while inpatient. Can switch to oral PPI b.i.d. when 
the patient is discharged.  

- Would recommend clear diet for now and slowly advance to a soft diet as 
hematocrit remains stable over the next 24 hours or so. Notify GI and de-
escalate diet if the patient develops nausea, vomiting, or abdominal pain to 
suggest gastric outlet obstruction.

- Please send H pylori stool antigen

- Recommend a repeat in 6 weeks to reassess the gastric ulcer. Biopsy unable to 
be obtained today due to quite difficult location of the ulcer, but it will be 
important to make sure that the ulcer has healed or at least is significantly 
on follow-up endoscopy.

- Avoid NSAIDs.

 

 [id number]/[facility] #: [id number]

[facility]

## 2018-10-09 LAB
BASOPHILS # BLD AUTO: 0 10^3/UL (ref 0–0.2)
BASOPHILS # BLD AUTO: 0.1 10^3/UL (ref 0–0.2)
EOSINOPHIL # BLD AUTO: 0.1 10^3/UL (ref 0–0.6)
EOSINOPHIL # BLD AUTO: 0.2 10^3/UL (ref 0–0.6)
HCT VFR BLD AUTO: 21 % (ref 35–47)
HCT VFR BLD AUTO: 24 % (ref 35–47)
HGB BLD-MCNC: 7.2 G/DL (ref 12–16)
HGB BLD-MCNC: 8.3 G/DL (ref 12–16)
LYMPHOCYTES # BLD AUTO: 1.6 10^3/UL (ref 1–4.8)
LYMPHOCYTES # BLD AUTO: 2 10^3/UL (ref 1–4.8)
MCH RBC QN AUTO: 30 PG (ref 27–31)
MCH RBC QN AUTO: 30 PG (ref 27–31)
MCHC RBC AUTO-ENTMCNC: 35 G/DL (ref 31–36)
MCHC RBC AUTO-ENTMCNC: 35 G/DL (ref 31–36)
MCV RBC AUTO: 86 FL (ref 80–97)
MCV RBC AUTO: 87 FL (ref 80–97)
MONOCYTES # BLD AUTO: 0.3 10^3/UL (ref 0–0.8)
MONOCYTES # BLD AUTO: 0.3 10^3/UL (ref 0–0.8)
NEUTROPHILS # BLD AUTO: 2.1 10^3/UL (ref 1.5–7.7)
NEUTROPHILS # BLD AUTO: 2.1 10^3/UL (ref 1.5–7.7)
NRBC # BLD AUTO: 0 10^3/UL
NRBC # BLD AUTO: 0 10^3/UL
NRBC BLD QL AUTO: 0.1
NRBC BLD QL AUTO: 0.2
PLATELET # BLD AUTO: 196 10^3/UL (ref 150–450)
PLATELET # BLD AUTO: 235 10^3/UL (ref 150–450)
RBC # BLD AUTO: 2.39 10^6/UL (ref 4–5.4)
RBC # BLD AUTO: 2.74 10^6/UL (ref 4–5.4)
WBC # BLD AUTO: 4.1 10^3/UL (ref 3.5–10.8)
WBC # BLD AUTO: 4.6 10^3/UL (ref 3.5–10.8)

## 2018-10-09 RX ADMIN — VERAPAMIL HYDROCHLORIDE SCH MG: 120 TABLET ORAL at 10:46

## 2018-10-09 RX ADMIN — PANTOPRAZOLE SODIUM SCH MG: 40 INJECTION, POWDER, FOR SOLUTION INTRAVENOUS at 20:40

## 2018-10-09 RX ADMIN — PANTOPRAZOLE SODIUM SCH MG: 40 INJECTION, POWDER, FOR SOLUTION INTRAVENOUS at 08:29

## 2018-10-09 RX ADMIN — VENLAFAXINE HYDROCHLORIDE SCH MG: 75 CAPSULE, EXTENDED RELEASE ORAL at 20:43

## 2018-10-09 RX ADMIN — ACETAMINOPHEN PRN MG: 325 TABLET ORAL at 20:45

## 2018-10-09 RX ADMIN — VERAPAMIL HYDROCHLORIDE SCH MG: 120 TABLET ORAL at 20:43

## 2018-10-09 RX ADMIN — VENLAFAXINE HYDROCHLORIDE SCH MG: 75 CAPSULE, EXTENDED RELEASE ORAL at 10:46

## 2018-10-09 RX ADMIN — SODIUM CHLORIDE SCH MLS/HR: 900 IRRIGANT IRRIGATION at 08:25

## 2018-10-09 RX ADMIN — LORAZEPAM PRN MG: 2 INJECTION INTRAMUSCULAR; INTRAVENOUS at 12:48

## 2018-10-09 RX ADMIN — SODIUM CHLORIDE SCH MLS/HR: 900 IRRIGANT IRRIGATION at 00:21

## 2018-10-09 NOTE — PN
Subjective


Date of Service: 10/09/18


Interval History: 





Just had another black bowel movement.  It was preceded by abdominal discomfort 

and nausea, all relieved after a bowel movement.  Her partner is here visiting.

  No pain now, feels good just hot.  Tolerating clears well, has good appetite 

and would be interested in advancing diet if allowed.  Feels more tired than 

usual 


Family History: Unchanged from Admission - liver/lung/lymphoma in her mom + 

thyoid cancer in her mom and sister aunt + breast ca


Social History: Unchanged from Admission - 2-3 cig daily but quit 9/18 no etoh 

walks indep comes from lives with 


Past Medical History: Unchanged from Admission - chornic lbp with 

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 

fibromyelgia hyperlipidemia htn ibs htn nancy  pshx r anxillary lump removed 1980 

benign s/p hysterectomy 1993, lbreast nodules resection with all benign path s/

p cholecystectomy 2009 s/p ablation of nerves in the lower back trauma to her 

left side of head ---> s/p staples





Objective


Active Medications: 








Acetaminophen (Tylenol Tab*)  650 mg PO Q6H PRN


   PRN Reason: PAIN


   Last Admin: 10/08/18 22:59 Dose:  650 mg


Lorazepam (Ativan Inj*)  0.5 mg IV PUSH Q4H PRN


   PRN Reason: ANXIETY


   Last Admin: 10/09/18 12:48 Dose:  0.5 mg


Ondansetron HCl (Zofran Inj*)  4 mg IV Q6H PRN


   PRN Reason: NAUSEA


   Last Admin: 10/09/18 10:12 Dose:  4 mg


Pantoprazole Sodium (Protonix Iv*)  40 mg IV BID [facility]


   Last Admin: 10/09/18 08:29 Dose:  40 mg


Venlafaxine HCl (Effexor Xr Cap*)  75 mg PO BID [facility]


   Last Admin: 10/09/18 10:46 Dose:  75 mg


Verapamil HCl (Calan Tab*)  120 mg PO BID [facility]


   Last Admin: 10/09/18 10:46 Dose:  120 mg








 Vital Signs - 8 hr











  10/09/18 10/09/18 10/09/18





  07:17 11:00 12:48


 


Temperature 97.9 F 98.6 F 


 


Pulse Rate 91 93 


 


Respiratory 18 18 16





Rate   


 


Blood Pressure 122/56 141/70 





(mmHg)   


 


O2 Sat by Pulse 97 97 





Oximetry   











Oxygen Devices in Use Now: None


Appearance: pale, no distress, comfortable


Eyes: No Scleral Icterus


Ears/Nose/Mouth/Throat: NL Teeth, Lips, Gums


Neck: NL Appearance and Movements; NL JVP


Respiratory: Symmetrical Chest Expansion and Respiratory Effort, Clear to 

Auscultation


Cardiovascular: NL Sounds; No Murmurs; No JVD, RRR


Abdominal: NL Sounds; No Tenderness; No Distention


Lymphatic: No Cervical Adenopathy


Extremities: No Edema


Skin: No Rash or Ulcers


Neurological: Alert and Oriented x 3


Result Diagrams: 


 10/09/18 09:03





 10/09/18 09:03


Microbiology and Other Data: 


 Microbiology











 10/07/18 21:08 Stool Occult Blood (MIGUEL A) - Final





 Stool 


 


 10/07/18 20:48 Gastric Occult Blood - Final





 Gastric Fluid 














Assess/Plan/Problems-Billing


Assessment: 





Ms. Vasquez is a 63 year old lady with history of constipation-prone IBS who 

presentsed with nausea, vomiting, and coffee ground emesis and was found to 

have a gastric ulcer.  





- Patient Problems


(1) Upper GI bleed


Current Visit: Yes   Status: Acute   Code(s): K92.2 - GASTROINTESTINAL 

HEMORRHAGE, UNSPECIFIED   SNOMED Code(s): 71247454


   Comment: EGD yesterday revealed a gastric ulcer; etiology unclear


she had been on mobic but hasn't taken it for a few weeks; no etoh


no history of liver disease 


hgb philip appears to be 7.8; continue to trend


hemodyamically stable


melena today unsurprising, likely old blood from ulcer    





(2) Acute blood loss anemia


Current Visit: Yes   Status: Acute   Code(s): D62 - ACUTE POSTHEMORRHAGIC 

ANEMIA   SNOMED Code(s): 548395960


   Comment: due to #1 


no indication for transfusion at this point; keep active type and screen and 

monitor hgb this afternoon    





(3) Migraines


Current Visit: Yes   Status: Acute   Code(s): G43.909 - MIGRAINE, UNSP, NOT 

INTRACTABLE, WITHOUT STATUS MIGRAINOSUS   SNOMED Code(s): 96533040


   Comment: resume verapamil today    





(4) Mood disorder


Current Visit: Yes   Status: Acute   Code(s): F39 - UNSPECIFIED MOOD [AFFECTIVE

] DISORDER   SNOMED Code(s): 91403937


   Comment: resume home meds today

## 2018-10-10 VITALS — DIASTOLIC BLOOD PRESSURE: 56 MMHG | SYSTOLIC BLOOD PRESSURE: 124 MMHG

## 2018-10-10 LAB
BASOPHILS # BLD AUTO: 0 10^3/UL (ref 0–0.2)
EOSINOPHIL # BLD AUTO: 0.1 10^3/UL (ref 0–0.6)
HCT VFR BLD AUTO: 20 % (ref 35–47)
HCT VFR BLD AUTO: 22 % (ref 35–47)
HGB BLD-MCNC: 7.1 G/DL (ref 12–16)
HGB BLD-MCNC: 7.7 G/DL (ref 12–16)
LYMPHOCYTES # BLD AUTO: 1.5 10^3/UL (ref 1–4.8)
MCH RBC QN AUTO: 30 PG (ref 27–31)
MCH RBC QN AUTO: 30 PG (ref 27–31)
MCHC RBC AUTO-ENTMCNC: 35 G/DL (ref 31–36)
MCHC RBC AUTO-ENTMCNC: 35 G/DL (ref 31–36)
MCV RBC AUTO: 86 FL (ref 80–97)
MCV RBC AUTO: 87 FL (ref 80–97)
MONOCYTES # BLD AUTO: 0.3 10^3/UL (ref 0–0.8)
NEUTROPHILS # BLD AUTO: 1.1 10^3/UL (ref 1.5–7.7)
NRBC # BLD AUTO: 0 10^3/UL
NRBC BLD QL AUTO: 0.5
PLATELET # BLD AUTO: 189 10^3/UL (ref 150–450)
PLATELET # BLD AUTO: 199 10^3/UL (ref 150–450)
RBC # BLD AUTO: 2.36 10^6/UL (ref 4–5.4)
RBC # BLD AUTO: 2.55 10^6/UL (ref 4–5.4)
WBC # BLD AUTO: 3 10^3/UL (ref 3.5–10.8)
WBC # BLD AUTO: 4 10^3/UL (ref 3.5–10.8)

## 2018-10-10 RX ADMIN — VERAPAMIL HYDROCHLORIDE SCH MG: 120 TABLET ORAL at 09:30

## 2018-10-10 RX ADMIN — ACETAMINOPHEN PRN MG: 325 TABLET ORAL at 12:47

## 2018-10-10 RX ADMIN — PANTOPRAZOLE SODIUM SCH MG: 40 INJECTION, POWDER, FOR SOLUTION INTRAVENOUS at 09:40

## 2018-10-10 RX ADMIN — VENLAFAXINE HYDROCHLORIDE SCH MG: 75 CAPSULE, EXTENDED RELEASE ORAL at 09:30

## 2018-10-11 NOTE — DS
DISCHARGE SUMMARY:

 

ADDENDUM:  There was some error in the venlafaxine dosing previously reported 
on her discharge medications.  She is to take venlafaxine 150 mg tablet q.h.s. 
as well as 75 mg tablet q.h.s.

 

 [id number]/[facility] #: [id number]

[facility]

## 2018-10-11 NOTE — DS
*** ADDENDUM NOW INCLUDED ON THIS REPORT ***



CC:  Dr. Arora *

 

DISCHARGE SUMMARY:

 

DATE OF ADMISSION:  10/07/18

 

DATE OF DISCHARGE:  10/10/18

 

PRINCIPAL DISCHARGE DIAGNOSES:

1.  Gastric ulcer.

2.  Acute blood loss anemia.

 

SECONDARY DISCHARGE DIAGNOSES:

1.  Chronic pain syndrome.

2.  Posttraumatic stress disorder.

3.  Mood disorder.

4.  Fibromyalgia.

5.  Depression and anxiety.

6.  Migraines.

7.  Constipation-prone irritable bowel syndrome.

 

PHYSICAL EXAM AT THE TIME OF DISCHARGE:  Temperature 98.2, heart rate 78, 
respiratory rate 18, pulse ox 94% on room air, blood pressure 128/64.  General: 
Alert, pale, in no distress.  HEENT:  Pupils equal, round, and reactive to 
light with conjunctival pallor.  Oral mucosa is moist.  Neck:  No JVP.  No 
cervical adenopathy.  Chest:  Regular rate and rhythm.  No murmurs.  PMI 
nondisplaced. Lungs are clear bilaterally.  Abdomen:  Soft, nontender, and 
nondistended.  No guarding or rebound.  No CVA tenderness.  Extremities:  No 
rashes, ulcers, or ecchymoses.

 

HOSPITAL COURSE BY PROBLEM:

1.  Upper gastrointestinal bleed due to gastric ulcer and acute blood loss 
anemia. Ms. Vasquez was admitted with coffee-ground emesis.  Her admission 
hemoglobin was 12.8 and the philip was 7.1.  She underwent endoscopy with Dr. Simon on 10/08/18 and at that time, a 1 cm clean-based prepyloric ulcer 
was found.  She also had associated edema and narrowing at the pylorus.  No 
intervention was taken.  No clear risk factor was found with this.  Ms. Vasquez 
had been on Mobic, but had not taken it for about a month.  Pathology was sent 
from the biopsy and is pending at the time of discharge.  On the morning of her 
discharge, her hemoglobin is 7.1.  I have another hemoglobin pending for 
several hours later and if it is below 7, I will give her a blood transfusion 
prior to leaving.  Otherwise, she felt quite well today.  She was observed for 
signs of gastric outlet obstruction due to the narrowing of the pylorus; however
, she felt well and has been tolerating a full diet and is requesting 
advancement of her diet this morning.  I have explained to her that she needs 
close GI followup.  She declines followup with our gastroenterologist and 
prefers to follow up with her gastroenterologist at [place] whose name she is 
unsure of.  It is essential that she follows up on the results of the H. pylori 
test.  Of note, a stool H. pylori antigen is also pending and has been sent.  
She also needs a repeat endoscopy in 6 to 8 weeks.  I have explained all of 
this to her and she understands and will follow up with both her primary and 
her gastroenterologist.  She is being discharged on Protonix 40 mg b.i.d.



2.  Migraine headaches.  Unfortunately while she was n.p.o., her verapamil had 
to be held and she did develop a migraine; however, this has been restarted.



3.  Depression.  She was continued on venlafaxine.

 

DISCHARGE MEDICATIONS:

1.  MiraLAX 17 g q.h.s.

2.  Zofran 4 mg q.8 p.r.n. nausea.

3.  Venlafaxine 75 mg b.i.d.

4.  Verapamil 120 p.o. b.i.d.

5.  Pantoprazole 40 mg b.i.d.

 

Please do not hesitate to contact me with any questions or concerns about this 
admission or discharge.

 

ADDENDUM:  



There was some error in the venlafaxine dosing previously reported on her 
discharge medications.  She is to take venlafaxine 150 mg tablet q.h.s. as well 
as 75 mg tablet q.h.s.

 



 [id number]/[facility] #: [id number]

A- [id number]/[facility] #: [id number]

[facility]

## 2019-11-25 ENCOUNTER — HOSPITAL ENCOUNTER (EMERGENCY)
Dept: HOSPITAL 25 - ED | Age: 64
Discharge: HOME | End: 2019-11-25
Payer: MEDICARE

## 2019-11-25 VITALS — DIASTOLIC BLOOD PRESSURE: 75 MMHG | SYSTOLIC BLOOD PRESSURE: 110 MMHG

## 2019-11-25 DIAGNOSIS — Z90.49: ICD-10-CM

## 2019-11-25 DIAGNOSIS — Z88.5: ICD-10-CM

## 2019-11-25 DIAGNOSIS — R10.33: Primary | ICD-10-CM

## 2019-11-25 DIAGNOSIS — E78.00: ICD-10-CM

## 2019-11-25 DIAGNOSIS — F31.9: ICD-10-CM

## 2019-11-25 DIAGNOSIS — Z98.51: ICD-10-CM

## 2019-11-25 DIAGNOSIS — I10: ICD-10-CM

## 2019-11-25 DIAGNOSIS — Z88.8: ICD-10-CM

## 2019-11-25 DIAGNOSIS — F17.210: ICD-10-CM

## 2019-11-25 DIAGNOSIS — Z90.710: ICD-10-CM

## 2019-11-25 LAB
ALBUMIN SERPL BCG-MCNC: 4.5 G/DL (ref 3.2–5.2)
ALBUMIN/GLOB SERPL: 1.6 {RATIO} (ref 1–3)
ALP SERPL-CCNC: 81 U/L (ref 34–104)
ALT SERPL W P-5'-P-CCNC: 35 U/L (ref 7–52)
ANION GAP SERPL CALC-SCNC: 8 MMOL/L (ref 2–11)
AST SERPL-CCNC: 25 U/L (ref 13–39)
BASOPHILS # BLD AUTO: 0 10^3/UL (ref 0–0.2)
BUN SERPL-MCNC: 8 MG/DL (ref 6–24)
BUN/CREAT SERPL: 7.7 (ref 8–20)
CALCIUM SERPL-MCNC: 10.2 MG/DL (ref 8.6–10.3)
CHLORIDE SERPL-SCNC: 102 MMOL/L (ref 101–111)
EOSINOPHIL # BLD AUTO: 0.1 10^3/UL (ref 0–0.6)
GLOBULIN SER CALC-MCNC: 2.9 G/DL (ref 2–4)
GLUCOSE SERPL-MCNC: 137 MG/DL (ref 70–100)
HCO3 SERPL-SCNC: 29 MMOL/L (ref 22–32)
HCT VFR BLD AUTO: 46 % (ref 35–47)
HGB BLD-MCNC: 15.7 G/DL (ref 12–16)
LYMPHOCYTES # BLD AUTO: 1.6 10^3/UL (ref 1–4.8)
MCH RBC QN AUTO: 30 PG (ref 27–31)
MCHC RBC AUTO-ENTMCNC: 35 G/DL (ref 31–36)
MCV RBC AUTO: 88 FL (ref 80–97)
MONOCYTES # BLD AUTO: 0.4 10^3/UL (ref 0–0.8)
NEUTROPHILS # BLD AUTO: 3.6 10^3/UL (ref 1.5–7.7)
NRBC # BLD AUTO: 0 10^3/UL
NRBC BLD QL AUTO: 0.1
PLATELET # BLD AUTO: 257 10^3/UL (ref 150–450)
POTASSIUM SERPL-SCNC: 3.4 MMOL/L (ref 3.5–5)
PROT SERPL-MCNC: 7.4 G/DL (ref 6.4–8.9)
RBC # BLD AUTO: 5.16 10^6 /UL (ref 3.7–4.87)
RBC UR QL AUTO: (no result)
SODIUM SERPL-SCNC: 139 MMOL/L (ref 135–145)
WBC # BLD AUTO: 5.8 10^3/UL (ref 3.5–10.8)
WBC UR QL AUTO: (no result)

## 2019-11-25 PROCEDURE — 83605 ASSAY OF LACTIC ACID: CPT

## 2019-11-25 PROCEDURE — 80053 COMPREHEN METABOLIC PANEL: CPT

## 2019-11-25 PROCEDURE — 86140 C-REACTIVE PROTEIN: CPT

## 2019-11-25 PROCEDURE — 87077 CULTURE AEROBIC IDENTIFY: CPT

## 2019-11-25 PROCEDURE — 87086 URINE CULTURE/COLONY COUNT: CPT

## 2019-11-25 PROCEDURE — 85025 COMPLETE CBC W/AUTO DIFF WBC: CPT

## 2019-11-25 PROCEDURE — 81003 URINALYSIS AUTO W/O SCOPE: CPT

## 2019-11-25 PROCEDURE — 81015 MICROSCOPIC EXAM OF URINE: CPT

## 2019-11-25 PROCEDURE — 99283 EMERGENCY DEPT VISIT LOW MDM: CPT

## 2019-11-25 PROCEDURE — 36415 COLL VENOUS BLD VENIPUNCTURE: CPT

## 2019-11-25 NOTE — ED
Abdominal Pain/Female





- HPI Summary


HPI Summary: 


The patient is a 63 y/o F arriving by ambulance to [facility] with a chief complaint 

of sudden onset umbilical pain this morning. She reports that she was up to 

have a BM as she is currently preparing for a colonoscopy this morning, and she 

developed umbilical pain. The pain was sharp and caused her to become 

diaphoretic, which is unlike previous abdominal pain episodes she has 

experienced, so she called for an ambulance. The pain then moved into the 

epigastric region and eventually completely resolved when EMS arrived. Currently

, she is not in any pain. She endorses nausea without vomiting. She notes she 

drank some milk after the pain began because it often helps her in these 

episodes. PMHx: diverticulosis, IBS, hysterectomy, cholecystectomy, HLD, HTN, 

fibromyalgia. Current every day smoker, no EtOH, no substance use. Medications 

reviewed. Allergies noted.





- History of Current Complaint


Stated Complaint: ABDOMINAL PAIN PER EMS


Hx Obtained From: Patient


Onset/Duration: Sudden Onset, Lasting Minutes, Resolved


Timing: Minutes


Severity Initially: Severe


Severity Currently: None


Pain Intensity: 0


Pain Scale Used: 0-10 Numeric


Location: Epigastric, Umbilical


Character: Sharp


Aggravating Factor(s): Nothing


Alleviating Factor(s): Spontaneous Resolution, Other: - darnk milk because it 

has helped her pain before in similar episodes


Associated Signs and Symptoms: Positive: Diaphoresis, Nausea.  Negative: 

Vomiting


Allergies/Adverse Reactions: 


 Allergies











Allergy/AdvReac Type Severity Reaction Status Date / Time


 


alprazolam Allergy  Unknown Verified 10/07/18 23:24





   Reaction  





   Details  


 


bupropion Allergy  Unknown Verified 10/07/18 23:24





   Reaction  





   Details  


 


codeine Allergy  Unknown Verified 10/07/18 23:24





   Reaction  





   Details  


 


pregabalin Allergy  Palpitation Verified 10/07/18 23:24





   s  


 


shellfish derived Allergy  Anaphylatic Verified 10/07/18 23:24





   Shock  


 


simvastatin Allergy  Rash Verified 10/07/18 23:24


 


tramadol Allergy  Leg Cramps Verified 10/07/18 23:24














PMH/Surg Hx/FS Hx/Imm Hx


Endocrine/Hematology History: 


   Denies: Hx Diabetes


Cardiovascular History: Reports: Hx Hypercholesterolemia, Hx Hypertension, 

Other Cardiovascular Problems/Disorders


   Denies: Hx Pacemaker/ICD


GI History: Reports: Hx Diverticulosis - Colonoscopy 2004, Hx Irritable Bowel, 

Other GI Disorders - Elevated Liver Enzymes


Musculoskeletal History: Reports: Hx Fibromyalgia, Hx Scoliosis, Other 

Musculoskeletal History - Degenerative Disc Disease


   Comment Only: Hx Back Problems - CHRONIC BACK PAIN


Sensory History: Reports: Hx Contacts or Glasses


   Denies: Hx Hearing Aid


Opthamlomology History: Reports: Hx Contacts or Glasses


Neurological History: Reports: Other Neuro Impairments/Disorders - LUMBAR 

STENOSIS


Psychiatric History: Reports: Hx Bipolar Disorder


   Denies: Hx Panic Disorder





- Cancer History


Cancer Type, Location and Year: Fibrocystic Breast (Left) Papilloma with 

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





- Surgical History


Surgical History: Yes


Surgery Procedure, Year, and Place: ; Intraductal Papilloma Biopsy 2003-

Hyperplasia left breast; Tonsillectomy; Hysterectomy-1993 left ovary intact; 

Tubal Ligation; Removal.  Benign Breast Lumps [id number]; Removal Benign Moles; 

Lymph Node Removal Axilla Right Arm; Cholecystectomy 6/29/09; Lump removed 

under right arm -1980; Chemical Burning of Nerves in lower back-2009


Infectious Disease History: Reports: History Other Infectious Disease - 

Toxoplasmosis





- Family History


Known Family History: Positive: Other - Cancer





- Social History


Alcohol Use: None


Hx Substance Use: No


Substance Use Type: Reports: None


Substance Use Comment - Amount & Last Used: fentanyl patch and hydrocodone


Hx Tobacco Use: Yes


Smoking Status (MU): Current Every Day Smoker


Type: Cigarettes


Amount Used/How Often: 1/2 PPD





Review of Systems


Positive: Skin Diaphoresis


Positive: Abdominal Pain - umbilical and epigastric, Nausea


All Other Systems Reviewed And Are Negative: Yes





Physical Exam





- Summary


Physical Exam Summary: 


Appearance: Well-appearing, Well-nourished, lying in bed comfortably


Skin: Warm, dry, no obvious rash


Eyes: sclera anicteric, no conjunctival pallor


ENT: mucous membranes moist, pharynx appears normal


Neck: Supple, nontender


Respiratory: Clear to auscultation, no signs of respiratory distress


Cardiovascular: Normal S1, S2. No murmurs. Normal distal pulses in tibial and 

radial bilaterally.


Abdomen: Soft, nontender, normal active bowel sounds present


Musculoskeletal: Normal, Strength/ROM Intact


Neurological: A&Ox3, awake and alert, mentation is normal, speech is fluent and 

appropriate


Psychiatric: affect is normal, does not appear anxious or depressed


Triage Information Reviewed: Yes


Vital Signs Reviewed: Yes





Procedures





- Sedation


Patient Received Moderate/Deep Sedation with Procedure: No





Diagnostics





- Laboratory


Result Diagrams: 


 11/25/19 03:19





 11/25/19 03:19


Lab Statement: Any lab studies that have been ordered have been reviewed, and 

results considered in the medical decision making process.





Abdominal Pain Fem Course/Dx





- Course


Course Of Treatment: Patient is a 63 y/o F with chief complaint of sudden onset 

umbilical pain that developed into epigastric pain this morning with associated 

nausea but no vomiting. Pain has completely resolved PTA. She is scheduled for 

a colonoscopy this morning. Physical exam reveals no acute abnormalities. Blood 

work without significant abnormality except for RBCs of 5.16, MPV of 6.5, 

potassium of 3.4, creatinine of 1.04, and glucose of 137. UA reveals 2+ protein

, 1+ blood, and 2+ leukocyte esterase. We discussed all results and plan for 

discharge home. She understands and agrees with this plan. Dx of acute 

abdominal pain.





- Diagnoses


Provider Diagnoses: 


 Acute abdominal pain








Discharge ED





- Sign-Out/Discharge


Documenting (check all that apply): Patient Departure - Patient will be 

discharged home.





- Discharge Plan


Condition: Improved


Disposition: HOME


Patient Education Materials:  Acute Abdominal Pain (ED)


Referrals: 


Willa Arora MD [Primary Care Provider] - If Needed





- Billing Disposition and Condition


Condition: IMPROVED


Disposition: Home





- Attestation Statements


Document Initiated by Belinda: Yes


Documenting Scribe: Radhika Hollis


Provider For Whom Belinda is Documenting (Include Credential): Dr. Obed Eugene MD


Scribe Attestation: 


Radhika ARGUETA scribed for Dr. Obed Eugene MD on 11/25/19 at 0558. 


Scribe Documentation Reviewed: Yes


Provider Attestation: 


The documentation as recorded by the Radhika medina accurately reflects 

the service I personally performed and the decisions made by me, Dr. Obed Eugene MD


Status of Scribe Document: Viewed

## 2019-11-27 ENCOUNTER — HOSPITAL ENCOUNTER (EMERGENCY)
Dept: HOSPITAL 25 - ED | Age: 64
Discharge: HOME | End: 2019-11-27
Payer: MEDICARE

## 2019-11-27 VITALS — SYSTOLIC BLOOD PRESSURE: 124 MMHG | DIASTOLIC BLOOD PRESSURE: 80 MMHG

## 2019-11-27 DIAGNOSIS — Z88.8: ICD-10-CM

## 2019-11-27 DIAGNOSIS — I10: ICD-10-CM

## 2019-11-27 DIAGNOSIS — Z90.49: ICD-10-CM

## 2019-11-27 DIAGNOSIS — M79.7: ICD-10-CM

## 2019-11-27 DIAGNOSIS — F31.9: ICD-10-CM

## 2019-11-27 DIAGNOSIS — Z88.5: ICD-10-CM

## 2019-11-27 DIAGNOSIS — Z90.710: ICD-10-CM

## 2019-11-27 DIAGNOSIS — R10.9: Primary | ICD-10-CM

## 2019-11-27 DIAGNOSIS — F17.210: ICD-10-CM

## 2019-11-27 DIAGNOSIS — E78.00: ICD-10-CM

## 2019-11-27 LAB
ALBUMIN SERPL BCG-MCNC: 3.8 G/DL (ref 3.2–5.2)
ALBUMIN/GLOB SERPL: 1.4 {RATIO} (ref 1–3)
ALP SERPL-CCNC: 62 U/L (ref 34–104)
ALT SERPL W P-5'-P-CCNC: 25 U/L (ref 7–52)
ANION GAP SERPL CALC-SCNC: 9 MMOL/L (ref 2–11)
AST SERPL-CCNC: 19 U/L (ref 13–39)
BASOPHILS # BLD AUTO: 0 10^3/UL (ref 0–0.2)
BUN SERPL-MCNC: 10 MG/DL (ref 6–24)
BUN/CREAT SERPL: 12 (ref 8–20)
CALCIUM SERPL-MCNC: 9.4 MG/DL (ref 8.6–10.3)
CHLORIDE SERPL-SCNC: 104 MMOL/L (ref 101–111)
EOSINOPHIL # BLD AUTO: 0.1 10^3/UL (ref 0–0.6)
GLOBULIN SER CALC-MCNC: 2.8 G/DL (ref 2–4)
GLUCOSE SERPL-MCNC: 135 MG/DL (ref 70–100)
HCO3 SERPL-SCNC: 25 MMOL/L (ref 22–32)
HCT VFR BLD AUTO: 44 % (ref 35–47)
HGB BLD-MCNC: 15.2 G/DL (ref 12–16)
LYMPHOCYTES # BLD AUTO: 0.4 10^3/UL (ref 1–4.8)
MCH RBC QN AUTO: 30 PG (ref 27–31)
MCHC RBC AUTO-ENTMCNC: 35 G/DL (ref 31–36)
MCV RBC AUTO: 88 FL (ref 80–97)
MONOCYTES # BLD AUTO: 0.2 10^3/UL (ref 0–0.8)
NEUTROPHILS # BLD AUTO: 4 10^3/UL (ref 1.5–7.7)
NRBC # BLD AUTO: 0 10^3/UL
NRBC BLD QL AUTO: 0.2
PLATELET # BLD AUTO: 229 10^3/UL (ref 150–450)
POTASSIUM SERPL-SCNC: 3.7 MMOL/L (ref 3.5–5)
PROT SERPL-MCNC: 6.6 G/DL (ref 6.4–8.9)
RBC # BLD AUTO: 5.01 10^6 /UL (ref 3.7–4.87)
SODIUM SERPL-SCNC: 138 MMOL/L (ref 135–145)
WBC # BLD AUTO: 4.7 10^3/UL (ref 3.5–10.8)

## 2019-11-27 PROCEDURE — 96374 THER/PROPH/DIAG INJ IV PUSH: CPT

## 2019-11-27 PROCEDURE — 99283 EMERGENCY DEPT VISIT LOW MDM: CPT

## 2019-11-27 PROCEDURE — 36415 COLL VENOUS BLD VENIPUNCTURE: CPT

## 2019-11-27 PROCEDURE — 80053 COMPREHEN METABOLIC PANEL: CPT

## 2019-11-27 PROCEDURE — 96375 TX/PRO/DX INJ NEW DRUG ADDON: CPT

## 2019-11-27 PROCEDURE — 85025 COMPLETE CBC W/AUTO DIFF WBC: CPT

## 2019-11-27 PROCEDURE — 74177 CT ABD & PELVIS W/CONTRAST: CPT

## 2019-11-27 NOTE — ED
Imaging and Labs Follow Up


Follow Up Type: Labs/Cultures


Labs/Culture Result: 


Urine culture growing 1-10k GBS and normal timur. 





Patient Communication/Plan: 


Minimal growth on culture. No report of urinary sxs on ed note. Will not treat 

at this time. 


Provider Diagnoses: 


 Abdominal pain

## 2019-11-27 NOTE — ED
Abdominal Pain/Female





- HPI Summary


HPI Summary: 





Pt is a 65 y/o F presenting to the ED brought in by EMS for abd pain. She 

recently had a colonoscopy/endoscopy, was sent home and doing fine, but around 

1500 on 11/26, she began feeling a cramping in her stomach. She had a protein 

shake to try and alleviate what she thought was hunger, but it worsened the 

pain and she vomited a couple of times. She denies other sx, such as fatigue or 

dizziness.





- History of Current Complaint


Chief Complaint: EDAbdPain


Stated Complaint: ABD PAIN PER EMS


Time Seen by Provider: 11/27/19 00:44


Hx Obtained From: Patient


Onset/Duration: Gradual Onset, Lasting Hours, Still Present


Timing: Hours


Severity Initially: Mild


Severity Currently: Mild


Pain Intensity: 2


Pain Scale Used: 0-10 Numeric


Location: Diffuse


Radiates: No


Aggravating Factor(s): Food


Alleviating Factor(s): Nothing


Associated Signs and Symptoms: Positive: Nausea, Vomiting.  Negative: Dizzy


Allergies/Adverse Reactions: 


 Allergies











Allergy/AdvReac Type Severity Reaction Status Date / Time


 


alprazolam Allergy  Unknown Verified 10/07/18 23:24





   Reaction  





   Details  


 


bupropion Allergy  Unknown Verified 10/07/18 23:24





   Reaction  





   Details  


 


codeine Allergy  Unknown Verified 10/07/18 23:24





   Reaction  





   Details  


 


pregabalin Allergy  Palpitation Verified 10/07/18 23:24





   s  


 


shellfish derived Allergy  Anaphylatic Verified 10/07/18 23:24





   Shock  


 


simvastatin Allergy  Rash Verified 10/07/18 23:24


 


tramadol Allergy  Leg Cramps Verified 10/07/18 23:24














PMH/Surg Hx/FS Hx/Imm Hx


Previously Healthy: Yes


Endocrine/Hematology History: 


   Denies: Hx Diabetes


Cardiovascular History: Reports: Hx Hypercholesterolemia, Hx Hypertension, 

Other Cardiovascular Problems/Disorders


   Denies: Hx Pacemaker/ICD


GI History: Reports: Hx Diverticulosis - Colonoscopy 2004, Hx Irritable Bowel, 

Other GI Disorders - Elevated Liver Enzymes


Musculoskeletal History: Reports: Hx Fibromyalgia, Hx Scoliosis, Other 

Musculoskeletal History - Degenerative Disc Disease


   Comment Only: Hx Back Problems - CHRONIC BACK PAIN


Sensory History: Reports: Hx Contacts or Glasses


   Denies: Hx Hearing Aid


Opthamlomology History: Reports: Hx Contacts or Glasses


Neurological History: Reports: Other Neuro Impairments/Disorders - LUMBAR 

STENOSIS


Psychiatric History: Reports: Hx Bipolar Disorder


   Denies: Hx Panic Disorder





- Cancer History


Cancer Type, Location and Year: Fibrocystic Breast (Left) Papilloma with 

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





- Surgical History


Surgery Procedure, Year, and Place: ; Intraductal Papilloma Biopsy 2003-

Hyperplasia left breast; Tonsillectomy; Hysterectomy-1993 left ovary intact; 

Tubal Ligation; Removal.  Benign Breast Lumps [id number]; Removal Benign Moles; 

Lymph Node Removal Axilla Right Arm; Cholecystectomy 6/29/09; Lump removed 

under right arm -1980; Chemical Burning of Nerves in lower back-2009


Infectious Disease History: No


Infectious Disease History: Reports: History Other Infectious Disease - 

Toxoplasmosis


   Denies: Traveled Outside the [place] in Last 30 Days





- Family History


Known Family History: Positive: Other - Cancer





- Social History


Alcohol Use: None


Hx Substance Use: No


Substance Use Type: Reports: None


Substance Use Comment - Amount & Last Used: fentanyl patch and hydrocodone


Hx Tobacco Use: Yes


Smoking Status (MU): Current Every Day Smoker


Type: Cigarettes


Amount Used/How Often: 1/2 PPD





Review of Systems


Negative: Fatigue


Positive: Abdominal Pain, Vomiting, Nausea


Neurological: Negative - dizziness


All Other Systems Reviewed And Are Negative: Yes





Physical Exam





- Summary


Physical Exam Summary: 





Appearance: Well-appearing, Well-nourished, lying in bed comfortably


Skin: Warm, dry, no obvious rash


Eyes: sclera anicteric, no conjunctival pallor


ENT: mucous membranes moist, pharynx appears normal


Neck: Supple, nontender


Respiratory: Clear to auscultation, no signs of respiratory distress


Cardiovascular: Normal S1, S2. No murmurs. Normal distal pulses in tibial and 

radial bilaterally.


Abdomen: Soft, nontender, normal active bowel sounds present


Musculoskeletal: Normal, Strength/ROM Intact


Neurological: A&Ox3, awake and alert, mentation is normal, speech is fluent and 

appropriate


Psychiatric: affect is normal, does not appear anxious or depressed





Triage Information Reviewed: Yes


Vital Signs On Initial Exam: 


 Initial Vitals











Temp Pulse Resp BP Pulse Ox


 


 97.6 F   79   20   117/72   97 


 


 11/27/19 00:40  11/27/19 00:40  11/27/19 00:40  11/27/19 00:40  11/27/19 00:40











Vital Signs Reviewed: Yes





Procedures





- Sedation


Patient Received Moderate/Deep Sedation with Procedure: No





Diagnostics





- Vital Signs


 Vital Signs











  Temp Pulse Resp BP Pulse Ox


 


 11/27/19 00:40  97.6 F  79  20  117/72  97














- Laboratory


Result Diagrams: 


 11/27/19 01:05





 11/27/19 01:05


Lab Statement: Any lab studies that have been ordered have been reviewed, and 

results considered in the medical decision making process.





- CT


  ** CT a/p


CT Interpretation Completed By: Radiologist


Summary of CT Findings: 1. No CT findings to correlate with patient's 

symptomatology.  2. Bosniak type I renal cyst. No followup indicated.  ED 

physician has reviewed this report.





Abdominal Pain Fem Course/Dx





- Course


Course Of Treatment: Pt is a 65 y/o F presenting to the ED brought in by EMS 

for abd pain. Around 1500 on 11/26, she began feeling a cramping in her 

stomach. She had a protein shake to try and alleviate what she thought was 

hunger, but it worsened the pain and she vomited a couple of times. She denies 

other sx, such as fatigue or dizziness.  Pt's physical exam is nml.  CT a/p 

shows: 1. No CT findings to correlate with patient's symptomatology.  2. 

Bosniak type I renal cyst. No followup indicated.  I ordered CT to cover risk 

of perforation given recent endoscopy. Since CT is nml, pt will be d/c'ed with 

dx of abd pain. She is stable and agreeable with this plan.





- Diagnoses


Provider Diagnoses: 


 Abdominal pain








Discharge ED





- Sign-Out/Discharge


Documenting (check all that apply): Patient Departure





- Discharge Plan


Condition: Stable


Disposition: HOME


Patient Education Materials:  Acute Abdominal Pain (ED)


Referrals: 


Willa Arora MD [Primary Care Provider] - 


Additional Instructions: 


The pain should resolve on its own over the next day or two. You can take 

tylenol. If it gets worse, contact your GI specialist for further advise.





- Billing Disposition and Condition


Condition: STABLE


Disposition: Home





- Attestation Statements


Document Initiated by Srinivasibe: Yes


Documenting Scribe: Naye Cheney


Provider For Whom Belinda is Documenting (Include Credential): Obed Eugene MD.


Scribe Attestation: 


Naye ARGUETA scribed for Obed Eugene MD. on 11/30/19 at 0331. 


Scribe Documentation Reviewed: Yes


Provider Attestation: 


The documentation as recorded by the Naye medina accurately 

reflects the service I personally performed and the decisions made by me, 

Obed Eugene MD.


Status of Belinda Document: Viewed